# Patient Record
Sex: FEMALE | Race: WHITE
[De-identification: names, ages, dates, MRNs, and addresses within clinical notes are randomized per-mention and may not be internally consistent; named-entity substitution may affect disease eponyms.]

---

## 2018-02-27 ENCOUNTER — HOSPITAL ENCOUNTER (EMERGENCY)
Dept: HOSPITAL 31 - C.ER | Age: 23
Discharge: LEFT BEFORE DISCHARGE | End: 2018-02-27
Payer: COMMERCIAL

## 2018-02-27 VITALS
SYSTOLIC BLOOD PRESSURE: 146 MMHG | HEART RATE: 118 BPM | OXYGEN SATURATION: 98 % | DIASTOLIC BLOOD PRESSURE: 94 MMHG | TEMPERATURE: 98.6 F | RESPIRATION RATE: 20 BRPM

## 2018-02-27 VITALS — BODY MASS INDEX: 32.8 KG/M2

## 2018-02-27 DIAGNOSIS — F19.10: ICD-10-CM

## 2018-02-27 DIAGNOSIS — Z02.89: Primary | ICD-10-CM

## 2018-02-28 NOTE — C.PDOC
Time Seen by Provider: 02/27/18 23:34


Chief Complaint (Nursing): Abnormal Skin Integrity





Past Medical History


Vital Signs: 





 Last Vital Signs











Temp  98.6 F   02/27/18 22:54


 


Pulse  118 H  02/27/18 22:54


 


Resp  20   02/27/18 22:54


 


BP  146/94 H  02/27/18 22:54


 


Pulse Ox  98   02/27/18 22:54














- [facility] Procedures











MANUAL ASSIST DELIV NEC (07/24/15)








Family History: States: Unknown Family Hx





- Social History


Hx Tobacco Use: Yes


Hx Alcohol Use: Yes


Hx Substance Use: Yes





- Immunization History


Hx Tetanus Toxoid Vaccination: Yes


Hx Influenza Vaccination: Yes


Hx Pneumococcal Vaccination: No





ED Course And Treatment


O2 Sat by Pulse Oximetry: 98





Disposition





- Disposition


Disposition Time: 23:00


Condition: UNKNOWN





- Clinical Impression


Clinical Impression: 


 Patient left without being seen